# Patient Record
Sex: MALE | Race: WHITE | NOT HISPANIC OR LATINO | Employment: OTHER | URBAN - METROPOLITAN AREA
[De-identification: names, ages, dates, MRNs, and addresses within clinical notes are randomized per-mention and may not be internally consistent; named-entity substitution may affect disease eponyms.]

---

## 2021-06-22 ENCOUNTER — TELEPHONE (OUTPATIENT)
Dept: CARDIOLOGY | Facility: MEDICAL CENTER | Age: 51
End: 2021-06-22

## 2021-06-22 NOTE — TELEPHONE ENCOUNTER
Called patient in regards to his NP appt with Dr. Qiu on 06/23/2021 at 4:00pm. Pt stated he has not seen a previous Cardiologist in the past, imaging/blood work is up to date and within chart.Let him know he will have an EKG at his appointment, and to not wear any lotion or the electrodes won't stick. Confirmed appt time and location, pt verbally understood.

## 2021-06-23 ENCOUNTER — OFFICE VISIT (OUTPATIENT)
Dept: CARDIOLOGY | Facility: PHYSICIAN GROUP | Age: 51
End: 2021-06-23
Payer: MEDICAID

## 2021-06-23 VITALS
OXYGEN SATURATION: 96 % | HEIGHT: 67 IN | HEART RATE: 72 BPM | WEIGHT: 173.2 LBS | DIASTOLIC BLOOD PRESSURE: 80 MMHG | SYSTOLIC BLOOD PRESSURE: 142 MMHG | RESPIRATION RATE: 16 BRPM | BODY MASS INDEX: 27.18 KG/M2

## 2021-06-23 DIAGNOSIS — R00.2 PALPITATIONS: ICD-10-CM

## 2021-06-23 DIAGNOSIS — Z87.891 FORMER TOBACCO USE: ICD-10-CM

## 2021-06-23 DIAGNOSIS — R06.02 SHORTNESS OF BREATH: ICD-10-CM

## 2021-06-23 DIAGNOSIS — I10 ESSENTIAL HYPERTENSION: ICD-10-CM

## 2021-06-23 DIAGNOSIS — R06.83 SNORES: ICD-10-CM

## 2021-06-23 DIAGNOSIS — J45.31 MILD PERSISTENT REACTIVE AIRWAY DISEASE WITH ACUTE EXACERBATION: ICD-10-CM

## 2021-06-23 PROCEDURE — 99204 OFFICE O/P NEW MOD 45 MIN: CPT | Performed by: INTERNAL MEDICINE

## 2021-06-23 RX ORDER — HYDROCODONE BITARTRATE AND ACETAMINOPHEN 5; 325 MG/1; MG/1
TABLET ORAL
COMMUNITY
Start: 2021-03-31 | End: 2021-06-23

## 2021-06-23 RX ORDER — AMOXICILLIN 500 MG/1
CAPSULE ORAL
COMMUNITY
Start: 2021-03-31 | End: 2021-06-23

## 2021-06-23 RX ORDER — PROPRANOLOL HYDROCHLORIDE 10 MG/1
10 TABLET ORAL 3 TIMES DAILY
Qty: 90 TABLET | Refills: 11 | Status: SHIPPED | OUTPATIENT
Start: 2021-06-23 | End: 2022-02-28 | Stop reason: SDUPTHER

## 2021-06-23 RX ORDER — DIVALPROEX SODIUM 125 MG/1
125 TABLET, DELAYED RELEASE ORAL
COMMUNITY
Start: 2021-06-09 | End: 2021-06-23

## 2021-06-23 RX ORDER — CHLORHEXIDINE GLUCONATE ORAL RINSE 1.2 MG/ML
SOLUTION DENTAL
COMMUNITY
Start: 2021-03-31 | End: 2021-06-23

## 2021-06-23 RX ORDER — ESCITALOPRAM OXALATE 20 MG/1
20 TABLET ORAL
COMMUNITY
Start: 2021-06-09 | End: 2021-06-23

## 2021-06-23 RX ORDER — DIAZEPAM 5 MG/1
TABLET ORAL
COMMUNITY
Start: 2021-06-09

## 2021-06-23 RX ORDER — HYDROXYZINE 50 MG/1
50 TABLET, FILM COATED ORAL
COMMUNITY
Start: 2021-06-09 | End: 2021-06-23

## 2021-06-23 RX ORDER — ESCITALOPRAM OXALATE 10 MG/1
10 TABLET ORAL
COMMUNITY
Start: 2021-04-14 | End: 2021-06-23

## 2021-06-23 NOTE — PROGRESS NOTES
"Subjective:   Chief Complaint:   Chief Complaint   Patient presents with   • Hypertension       Grupo Berger is a 51 y.o. male who is self referred for HTN, former smoker, palpitations, shortness of breath.    Currently does not have a PCP.    Has severe anxiety and panic disorder and PTSD.  Recent trigger for PTSD.    Having vital taken, BP has been high despite control of anxiety.  Not sure if anxiety and BP are related.    No meds for HTN in the past.    Also, having nasal problems since covid.    Snores, not sure if apnea.    Palpitations, racing at times, typically associated with PTSD but sometimes without.  Sometimes a few rapid beats, sometimes a skipped beat.    He is limited by foot injury, prior trauma, can walk one mile.  Get some shortness of breath, used to go to the hospital, felt ceiling closing in.  No hx asthma. Prior Flu A, PNA.  Notes shortness of breath more related to mood.  No significant dyspnea on exertion, orthopnea or lower extremity swelling.   Turns to the side to breath better through mouth/nose.    Sometimes feels a pop in the middle of the chest, always feels like it is there.  Does not notice it when active.    Wonders if he has had \"minor strokes.\" Can get slurred speech, trouble with walking, dexterity, blurred vision.  Still happening, some recent.  Notices he walks different.    Former smoker, quit 2018.    No prior hyperlipidemia.  No family history of premature coronary artery disease.  Biologic father history not known.  No history of diabetes.  No history of autoimmune disease such as lupus or rheumatoid arthritis.  No chronic kidney disease. Prior surgery for stone.  No ETOH overuse.  No caffeine overuse.  No recreation substance use.    No significant palpitations, lightheadedness, or presyncope/syncope.   No symptoms of leg claudication, has some pain along left side from foot injury.    Has a girlfriend.  From [place], lived in [place].    DATA REVIEWED by " me:  ECG (my personal interpretation) 6-23-21  Sinus, 75    Echo none    Most recent labs:       Lab Results   Component Value Date/Time    WBC 6.2 11/03/2013 02:07 AM    HEMOGLOBIN 13.4 (L) 11/03/2013 02:07 AM    HEMATOCRIT 39.9 (L) 11/03/2013 02:07 AM    MCV 92.5 11/03/2013 02:07 AM    INR 0.97 10/28/2013 01:47 AM      Lab Results   Component Value Date/Time    SODIUM 138 11/03/2013 02:07 AM    POTASSIUM 4.2 11/03/2013 02:07 AM    CHLORIDE 105 11/03/2013 02:07 AM    CO2 25 11/03/2013 02:07 AM    GLUCOSE 89 11/03/2013 02:07 AM    BUN 15 11/03/2013 02:07 AM    CREATININE 1.05 11/03/2013 02:07 AM      Lab Results   Component Value Date/Time    ASTSGOT 25 10/31/2013 02:48 AM    ALTSGPT 15 10/31/2013 02:48 AM    ALBUMIN 4.0 10/31/2013 02:48 AM      No results found for: CHOLSTRLTOT, LDL, HDL, TRIGLYCERIDE  No results for input(s): NTPROBNP, TROPONINT in the last 72 hours.      Past Medical History:   Diagnosis Date   • CHI (closed head injury)    • Tobacco use     Per H&P     Past Surgical History:   Procedure Laterality Date   • CALCANEUS ORIF  10/30/2013    Performed by Jeovanny Wong M.D. at SURGERY [facility]   • HERNIA REPAIR       History reviewed. No pertinent family history.  Social History     Socioeconomic History   • Marital status: Single     Spouse name: Not on file   • Number of children: Not on file   • Years of education: Not on file   • Highest education level: Not on file   Occupational History   • Not on file   Tobacco Use   • Smoking status: Current Some Day Smoker     Types: Cigarettes   • Smokeless tobacco: Never Used   Substance and Sexual Activity   • Alcohol use: Yes     Comment: 1-2x/month   • Drug use: No   • Sexual activity: Not on file   Other Topics Concern   • Not on file   Social History Narrative   • Not on file     Social Determinants of Health     Financial Resource Strain:    • Difficulty of Paying Living Expenses:    Food Insecurity:    • Worried About Running Out of Food  "in the Last Year:    • Ran Out of Food in the Last Year:    Transportation Needs:    • Lack of Transportation (Medical):    • Lack of Transportation (Non-Medical):    Physical Activity:    • Days of Exercise per Week:    • Minutes of Exercise per Session:    Stress:    • Feeling of Stress :    Social Connections:    • Frequency of Communication with Friends and Family:    • Frequency of Social Gatherings with Friends and Family:    • Attends [employer] Services:    • Active Member of Clubs or Organizations:    • Attends Club or Organization Meetings:    • Marital Status:    Intimate Partner Violence:    • Fear of Current or Ex-Partner:    • Emotionally Abused:    • Physically Abused:    • Sexually Abused:      Allergies   Allergen Reactions   • Coconut Oil Vomiting     All forms of coconut.  Pt reports N/V.       Current Outpatient Medications   Medication Sig Dispense Refill   • diazePAM (VALIUM) 5 MG Tab TAKE 1 TABLET BY MOUTH TWICE DAILY AS NEEDED FOR SEVERE ANXIETY     • propranolol (INDERAL) 10 MG Tab Take 1 tablet by mouth 3 times a day. 90 tablet 11     No current facility-administered medications for this visit.       ROS  All others systems reviewed and negative.     Objective:     /80 (BP Location: Left arm, Patient Position: Sitting, BP Cuff Size: Adult)   Pulse 72   Resp 16   Ht 1.702 m (5' 7\")   Wt 78.6 kg (173 lb 3.2 oz)   SpO2 96%  Body mass index is 27.13 kg/m².    General: No acute distress. Well nourished.  HEENT: EOM grossly intact, no scleral icterus, no pharyngeal erythema.   Neck:  No JVD, no bruits, trachea midline  CVS: RRR. Normal S1, S2. No M/R/G. No LE edema.  2+ radial pulses, 2+ PT pulses  Resp: Insp/exp wheezing, Normal respiratory effort.  Abdomen: Soft, NT, no primo hepatomegaly.  MSK/Ext: No clubbing or cyanosis.  Skin: Warm and dry, no rashes.  Neurological: CN III-XII grossly intact. No focal deficits.   Psych: A&O x 3, appropriate affect, good " judgement        Assessment:     1. Essential hypertension  EKG   2. Palpitations  Cardiac Event Monitor    EC-ECHOCARDIOGRAM COMPLETE W/O CONT   3. Snores     4. Former tobacco use     5. Shortness of breath  EC-ECHOCARDIOGRAM COMPLETE W/O CONT   6. Mild persistent reactive airway disease with acute exacerbation  REFERRAL TO PULMONARY AND SLEEP MEDICINE       Medical Decision Making:  Today's Assessment / Status / Plan:     -Needs PCP, he needs treatment for reactive airway, pulm would be ok, referral today  -His BP is a little elevated, see below  -No evidence of LVH on ECG   -BioTel if possible  -Echo  -Try propranolol, see below  -He would benefit from PCP, not trusting of PCP right now  -APN 4 weeks    Written instructions given today:      Checking Blood Pressure:  -Blood pressure cuff, spend in the $40-65, with good return policy  -It should be automatic, upper arm, measure your arm to get the correct size, probably adult Large but your arm should be under 16.5 cm. If you need an XL cuff, you will have to have it special ordered from a pharmacy or durable medical equipment company.  -Put the cuff in place, rest arm on table near height of your heart, sit quietly for 5 min, legs uncrossed, with back support, then take your blood pressure, write it down, keep a log  -Check no more than 1 time day, maybe 4-5 times per week, try different times of day.  -Can bring your cuff to at least one appointment where it can be calibrated to a manual cuff if you are concerned.  -Goal blood pressure is at least under 130/80, ideally under 120/80.  If you think your BP is overall too high, let us know in the office, we can adjust medications, can use Owned ithart or call the [place] office: [phone].    -Blood pressure is a long-term problem so we do not need to solve it today.  People's blood pressure tends to increase over time with age so it could be that.  Stress can also increase blood pressure.  Sometimes people's blood  pressure comes down over time.    -Salt=sodium=sea salt, guidelines say stay under 2,500 mg daily but I ask for under 4,000 mg daily.  Get salt smart, start looking at labels, count it up.  Salt is hidden in everything, salad dressing, sauces, cheese, most canned food, any processed meat.    -Heart monitor to see with the heart racing and palpitations are doing    -Echocardiogram of the heart which is an ultrasound of the heart to see the shape and structure of your heart.  I suspect based on your exam today that you have a structurally normal heart.    -We need to get you in with some type of doctor to treat your reactive airway with inhalers to get your wheezing settle down and that should help with the shortness of breath.    -Propranolol is a wonderful medication that can help with blood pressure, palpitations and anxiety if you feel well taking it and do not have side effects.    -When we start a medication I guarantee 1 of 3 things will happen, you feel the same, you will feel better or you feel worse.  We can try the propranolol, if you feel worse for any reason then you stop taking it.  There is no harm.    -Start taking propranolol 10 mg 3 times daily.  Maximum dose is 30 mg 3 times daily.  You can actually increase the dose on your own to find a dose that helps you feel better in terms of anxiety and blood pressure without significant side effects.    -Beta-blockers sometimes are associated with mild erectile dysfunction but not typically with this 1, sometimes this medication can make breathing worse or lower blood pressure or make you feel fatigued.          Return in about 4 weeks (around 7/21/2021).    It is my pleasure to participate in the care of Mr. Berger.  Please do not hesitate to contact me with questions or concerns.    Stefani Qiu MD, [facility]  Cardiologist [facility]    Please note that this dictation was created using voice recognition software. I have  made every reasonable attempt to correct obvious errors, but it is possible there are errors of grammar and possibly content that I did not discover before finalizing the note.

## 2021-06-23 NOTE — PATIENT INSTRUCTIONS
Checking Blood Pressure:  -Blood pressure cuff, spend in the $40-65, with good return policy  -It should be automatic, upper arm, measure your arm to get the correct size, probably adult Large but your arm should be under 16.5 cm. If you need an XL cuff, you will have to have it special ordered from a pharmacy or durable medical equipment company.  -Put the cuff in place, rest arm on table near height of your heart, sit quietly for 5 min, legs uncrossed, with back support, then take your blood pressure, write it down, keep a log  -Check no more than 1 time day, maybe 4-5 times per week, try different times of day.  -Can bring your cuff to at least one appointment where it can be calibrated to a manual cuff if you are concerned.  -Goal blood pressure is at least under 130/80, ideally under 120/80.  If you think your BP is overall too high, let us know in the office, we can adjust medications, can use test company or call the [facility] office: [phone].    -Blood pressure is a long-term problem so we do not need to solve it today.  People's blood pressure tends to increase over time with age so it could be that.  Stress can also increase blood pressure.  Sometimes people's blood pressure comes down over time.    -Salt=sodium=sea salt, guidelines say stay under 2,500 mg daily but I ask for under 4,000 mg daily.  Get salt smart, start looking at labels, count it up.  Salt is hidden in everything, salad dressing, sauces, cheese, most canned food, any processed meat.    -Heart monitor to see with the heart racing and palpitations are doing    -Echocardiogram of the heart which is an ultrasound of the heart to see the shape and structure of your heart.  I suspect based on your exam today that you have a structurally normal heart.    -We need to get you in with some type of doctor to treat your reactive airway with inhalers to get your wheezing settle down and that should help with the shortness of breath.    -Propranolol is a  wonderful medication that can help with blood pressure, palpitations and anxiety if you feel well taking it and do not have side effects.    -When we start a medication I guarantee 1 of 3 things will happen, you feel the same, you will feel better or you feel worse.  We can try the propranolol, if you feel worse for any reason then you stop taking it.  There is no harm.    -Start taking propranolol 10 mg 3 times daily.  Maximum dose is 30 mg 3 times daily.  You can actually increase the dose on your own to find a dose that helps you feel better in terms of anxiety and blood pressure without significant side effects.    -Beta-blockers sometimes are associated with mild erectile dysfunction but not typically with this 1, sometimes this medication can make breathing worse or lower blood pressure or make you feel fatigued.

## 2021-06-25 ENCOUNTER — NON-PROVIDER VISIT (OUTPATIENT)
Dept: CARDIOLOGY | Facility: MEDICAL CENTER | Age: 51
End: 2021-06-25
Payer: MEDICAID

## 2021-06-25 ENCOUNTER — TELEPHONE (OUTPATIENT)
Dept: CARDIOLOGY | Facility: MEDICAL CENTER | Age: 51
End: 2021-06-25

## 2021-06-25 DIAGNOSIS — I49.3 PVC'S (PREMATURE VENTRICULAR CONTRACTIONS): ICD-10-CM

## 2021-06-25 DIAGNOSIS — R00.2 PALPITATIONS: ICD-10-CM

## 2021-06-25 PROCEDURE — 93268 ECG RECORD/REVIEW: CPT | Performed by: INTERNAL MEDICINE

## 2021-06-25 NOTE — TELEPHONE ENCOUNTER
Patient enrolled in the 14 day Bio-[facility] monitoring program per .  >Mailed out [employer]  >Pending EOS.

## 2021-06-29 ENCOUNTER — TELEPHONE (OUTPATIENT)
Dept: CARDIOLOGY | Facility: MEDICAL CENTER | Age: 51
End: 2021-06-29

## 2021-06-29 DIAGNOSIS — I10 ESSENTIAL HYPERTENSION: ICD-10-CM

## 2021-06-29 RX ORDER — BLOOD PRESSURE TEST KIT
1 KIT MISCELLANEOUS ONCE
Qty: 1 KIT | Refills: 0 | Status: SHIPPED | OUTPATIENT
Start: 2021-06-29 | End: 2021-06-29

## 2021-06-29 NOTE — TELEPHONE ENCOUNTER
Returned call. Pt states his blood pressure was 162/120 yesterday when he went to see his therapist. Pt does not have his own BP cuff at home so he does not know what his BP is today. Pt reports he has been taking his propanolol 10 mg TID since LS prescribed it on 6/23/21. Informed pt that LS did write in his instructions that he may titrate his propanolol up on his own up to 30 mg TID.     Advised that he get a BP cuff for home asap so he can monitor BP daily and determine if he needs to increase propanolol up from 10 mg TID. Pt states he was told his insurance may cover the cost of his BP cuff but is not sure how. I informed him that we can write an rx for this, I will mail it to him, and then he should take it to his local pharmacy asa to get one.     In the mean time, I asked him to make sure he is having his BP checked at his therapy place and recording the numbers. He will call if his BP remain elevated at these appointments. Pt does report he has significant anxiety and PTSD. He is on medications through his therapy place for this. He will monitor his BP at home once he gets a BP cuff.  Reviewed ED precautions.

## 2021-06-29 NOTE — TELEPHONE ENCOUNTER
LS    Pt called stating his blood pressure has gone up to 162/120.   Pt also states yesterday he was prescribed Abilify and trazadone.  Please call Pt back at [phone].    Thank you

## 2021-07-07 ENCOUNTER — APPOINTMENT (OUTPATIENT)
Dept: CARDIOLOGY | Facility: PHYSICIAN GROUP | Age: 51
End: 2021-07-07
Payer: MEDICAID

## 2021-12-13 ENCOUNTER — TELEPHONE (OUTPATIENT)
Dept: CARDIOLOGY | Facility: MEDICAL CENTER | Age: 51
End: 2021-12-13

## 2021-12-13 NOTE — TELEPHONE ENCOUNTER
Clearance request received from [facility] for pt's right laser lithotripsy stone removal and stent placement asap w/ Dr. Lara. Fax clearance response to [phone].     To LS, please advise

## 2021-12-13 NOTE — LETTER
PROCEDURE/SURGERY CLEARANCE FORM      Encounter Date: 12/13/2021    Patient: Grupo Berger  YOB: 1970    CARDIOLOGIST:  Stefani Qiu MD    The above patient is not high risk to have the following procedure/surgery: Right laser lithotripsy stone removal and stent placement       Thank you,    Stefani Qiu MD  Electronically signed

## 2021-12-14 NOTE — TELEPHONE ENCOUNTER
-Not high risk for procedure, no medications to hold.    Kind of ridiculous since I only see him for high blood pressure.    Thank you

## 2022-02-28 ENCOUNTER — TELEPHONE (OUTPATIENT)
Dept: CARDIOLOGY | Facility: MEDICAL CENTER | Age: 52
End: 2022-02-28
Payer: MEDICAID

## 2022-02-28 RX ORDER — PROPRANOLOL HYDROCHLORIDE 10 MG/1
10 TABLET ORAL 3 TIMES DAILY
Qty: 270 TABLET | Refills: 1 | Status: SHIPPED | OUTPATIENT
Start: 2022-02-28 | End: 2022-04-18 | Stop reason: SDUPTHER

## 2022-02-28 NOTE — TELEPHONE ENCOUNTER
LS    Pt is needing refill for:   propranolol (INDERAL) 10 MG Tab (Order #[id number]) on 6/23/21.  He is out of this medication.     Grupo  [phone]    Thank you,    Diane LONG

## 2022-04-18 ENCOUNTER — TELEPHONE (OUTPATIENT)
Dept: CARDIOLOGY | Facility: MEDICAL CENTER | Age: 52
End: 2022-04-18
Payer: MEDICAID

## 2022-04-18 RX ORDER — PROPRANOLOL HYDROCHLORIDE 20 MG/1
30 TABLET ORAL 3 TIMES DAILY
Qty: 405 TABLET | Refills: 0 | Status: SHIPPED | OUTPATIENT
Start: 2022-04-18 | End: 2022-04-19 | Stop reason: SDUPTHER

## 2022-04-18 NOTE — TELEPHONE ENCOUNTER
Per LS's OV note on 6/23/21, pt could self-titrate up to the maximum dose of 30 mg 3x daily for management. Rx sent in for this dose/frequency as requested by pt.

## 2022-04-18 NOTE — TELEPHONE ENCOUNTER
LS    Patient called to advise they tried to get their propranolol (INDERAL) 10 MG Tab refilled. They were told they should be taking it as 1 tablet 3x's a day. The instructions he was understanding and how they feel, they take 3 tablets 3x's a day. They would liike the refill to match their understanding. They are all out of the rx. If you have questions you can contact them at [phone].     Thank you,  Galina FRYE

## 2022-04-19 RX ORDER — PROPRANOLOL HYDROCHLORIDE 20 MG/1
30 TABLET ORAL 3 TIMES DAILY
Qty: 405 TABLET | Refills: 0 | Status: SHIPPED | OUTPATIENT
Start: 2022-04-19 | End: 2022-11-10

## 2022-04-19 NOTE — TELEPHONE ENCOUNTER
LS    Pt states the Pharmacy will not fill his medication of Propranolol (INDERAL) 10 MG. The Pharmacy is needing a new written prescription for him to take 3 tablets  3x a day. Patient is out and has not taken this medication and is starting to feel off. Please send the information over to the Pharmacy on file. He is very confused as to what is going on. Pt would like a call back @ : [phone]     Thank You  Yolande MORALEZ

## 2022-04-19 NOTE — TELEPHONE ENCOUNTER
LS    Pt states that he was at [facility] today and  his prescription is voided. They gave him 2 print outs showing it is being denied. HIS BP is now 161/ 135 since he has not taken the medication since yesterday. He is very stressed out.  The print out he was given also shows that physicians must call [facility] for an override. Pt is very confused on this.  He thinks there is an issue going on.  [phone] since they will not pay for it.  Please reach out to him @ PH: [phone]    Thank You  Yolande MORALEZ

## 2022-04-19 NOTE — TELEPHONE ENCOUNTER
Called [facility]. They state they were having some computer system issues and only had the old rx on file. I resent the updated rx to them, they will contact pt when it is ready.     Called pt and informed him, he states that he also talked to the pharmacy and it sounds like he will be able to pick it up. Advised him to take the prescribed Propanolol once received and recheck blood pressure, pt states BP has been this elevated in the past at times, which is why he wanted to stay on top of getting the Propanolol asap. Advised him to call us if BP stays elevated. Reviewed ER precautions.

## 2022-04-19 NOTE — TELEPHONE ENCOUNTER
Rx for Propanolol 30 mg TID [facility]. LVM on pt's personal VM informing him.

## 2022-04-22 ENCOUNTER — TELEPHONE (OUTPATIENT)
Dept: CARDIOLOGY | Facility: MEDICAL CENTER | Age: 52
End: 2022-04-22
Payer: MEDICAID

## 2022-06-15 ENCOUNTER — TELEPHONE (OUTPATIENT)
Dept: CARDIOLOGY | Facility: PHYSICIAN GROUP | Age: 52
End: 2022-06-15

## 2022-06-15 NOTE — TELEPHONE ENCOUNTER
"CHART PREP SAVED:  No-show 4/27/2022 and 6/15/2022      Grupo Berger is a 52 y.o. male who returns for HTN, former smoker, palpitations, shortness of breath.    Currently does not have a PCP.    Has severe anxiety and panic disorder and PTSD.  Recent trigger for PTSD.    Having vital taken, BP has been high despite control of anxiety.  Not sure if anxiety and BP are related.    No meds for HTN in the past.    Also, having nasal problems since covid.    Snores, not sure if apnea.    Palpitations, racing at times, typically associated with PTSD but sometimes without.  Sometimes a few rapid beats, sometimes a skipped beat.  12 day monitor with normal sinus rhythm.  Rare PVCs.  No rhythm changes.  Symptoms during sinus rhythm and sinus tachycardia.    He is limited by foot injury, prior trauma, can walk one mile.  Get some shortness of breath, used to go to the hospital, felt ceiling closing in.  No hx asthma. Prior Flu A, PNA.  Notes shortness of breath more related to mood.  No significant dyspnea on exertion, orthopnea or lower extremity swelling.   Turns to the side to breath better through mouth/nose.    Sometimes feels a pop in the middle of the chest, always feels like it is there.  Does not notice it when active.    Wonders if he has had \"minor strokes.\" Can get slurred speech, trouble with walking, dexterity, blurred vision.  Still happening, some recent.  Notices he walks different.    Former smoker, quit 2018.    No prior hyperlipidemia.  No family history of premature coronary artery disease.  Biologic father history not known.  No history of diabetes.  No history of autoimmune disease such as lupus or rheumatoid arthritis.  No chronic kidney disease. Prior surgery for stone.  No ETOH overuse.  No caffeine overuse.  No recreation substance use.    No significant palpitations, lightheadedness, or presyncope/syncope.   No symptoms of leg claudication, has some pain along left side from foot " injury.    Has a girlfriend.  From [place], lived in [place].    DATA REVIEWED by me:  ECG (my personal interpretation) 6-23-21  Sinus, 75    BioTel 12 day Summary:   1. Sinus rhythm with appropriate heart rate range. Average 81, range 51 to 120 bpm.   2. Normal sinus node and AV node conduction normal. No pauses.   3.  No PACs.   4. Rare PVCs.   5. No sustained rhythm changes. No atrial fibrillation/flutter.   6.  Patient diary reported heart racing, chest pain, other symptoms, all during normal sinus rhythm and sinus tachycardia.   Conclusion: Normal sinus rhythm.  Rare PVCs.  No rhythm changes.  Symptoms during sinus rhythm and sinus tachycardia.     Echo none

## 2022-11-06 DIAGNOSIS — I49.3 PVC'S (PREMATURE VENTRICULAR CONTRACTIONS): ICD-10-CM

## 2022-11-07 NOTE — TELEPHONE ENCOUNTER
Is the patient due for a refill? Yes    Was the patient seen the past year? Yes    Date of last office visit: 6/23/21    Does the patient have an upcoming appointment?  No   If yes, When?     Provider to refill:VAMSHI STEWART  ADD    Does the patients insurance require a 100 day supply?  No

## 2022-11-09 ENCOUNTER — TELEPHONE (OUTPATIENT)
Dept: CARDIOLOGY | Facility: MEDICAL CENTER | Age: 52
End: 2022-11-09
Payer: MEDICAID

## 2022-11-09 NOTE — TELEPHONE ENCOUNTER
ADD      **Pt was last seen w/ LS 06/2021 and has had 2 cancelled appts and 1 NC/NS since that 1st appt. Advised pt he needed to be seen to continue refills. Stated he only could go to [facility] and next avail appt is 02- over a year and half since last seen with anyone in office.        Caller: Grupo    Medication Name and Dosage:     propranolol (INDERAL) 20 MG Tab    Please call your pharmacy and have them send us a refill request or speak to a live representative, RX number may have changed.    Medication amount left: 2 days    Preferred Pharmacy:     [id number] -   [address] [address]   Phone:  [phone]  Fax:  [phone]   BREN #:  [id number]    Other questions (Topic): See notes above    Callback Number (Will only call for issues): [phone]

## 2022-11-10 RX ORDER — PROPRANOLOL HYDROCHLORIDE 20 MG/1
TABLET ORAL
Qty: 405 TABLET | Refills: 0 | Status: SHIPPED | OUTPATIENT
Start: 2022-11-10

## 2022-11-11 NOTE — TELEPHONE ENCOUNTER
propranolol (INDERAL) 20 MG Tab 405 Tablets Refills: 0/0 Start: 11/10/2022     Sig: TAKE 1 1/2 TABLETS BY MOUTH THREE TIMES DAILY.    Sent to pharmacy as: Propranolol HCl 20 MG Oral Tablet (INDERAL)      Sent to: [id number] - [place], [place] - [address] AT [facility] [address]    Patient scheduled with AB on 2/1/23.

## 2024-10-07 NOTE — TELEPHONE ENCOUNTER
Called patient regarding echo ordered at previous OV by LS. Patient has not gotten echo done. Pt stated he did wear heart monitor, results in chart. Gave patient current office address and verified appt date and time with LS on 6/15/22.   
Called patient regarding echocardiogram ordered at patient's last OV by LS. Called to verify if patient has had echo done outside of [facility] so records can be requested. No answer, LVM for patient to call back.   
Yes...